# Patient Record
Sex: MALE | Race: BLACK OR AFRICAN AMERICAN | Employment: FULL TIME | URBAN - METROPOLITAN AREA
[De-identification: names, ages, dates, MRNs, and addresses within clinical notes are randomized per-mention and may not be internally consistent; named-entity substitution may affect disease eponyms.]

---

## 2017-01-04 ENCOUNTER — OFFICE VISIT (OUTPATIENT)
Dept: INTERNAL MEDICINE CLINIC | Age: 22
End: 2017-01-04

## 2017-01-04 VITALS
BODY MASS INDEX: 22.5 KG/M2 | WEIGHT: 180 LBS | HEART RATE: 67 BPM | OXYGEN SATURATION: 98 % | SYSTOLIC BLOOD PRESSURE: 112 MMHG | DIASTOLIC BLOOD PRESSURE: 72 MMHG

## 2017-01-04 DIAGNOSIS — R05.9 COUGH: Primary | ICD-10-CM

## 2017-01-04 DIAGNOSIS — R06.2 WHEEZING: ICD-10-CM

## 2017-01-04 PROCEDURE — 94640 AIRWAY INHALATION TREATMENT: CPT | Performed by: INTERNAL MEDICINE

## 2017-01-04 PROCEDURE — 99204 OFFICE O/P NEW MOD 45 MIN: CPT | Performed by: INTERNAL MEDICINE

## 2017-01-04 RX ORDER — IPRATROPIUM BROMIDE AND ALBUTEROL SULFATE 2.5; .5 MG/3ML; MG/3ML
1 SOLUTION RESPIRATORY (INHALATION) ONCE
Status: COMPLETED | OUTPATIENT
Start: 2017-01-04 | End: 2017-01-04

## 2017-01-04 RX ADMIN — IPRATROPIUM BROMIDE AND ALBUTEROL SULFATE 1 AMPULE: 2.5; .5 SOLUTION RESPIRATORY (INHALATION) at 16:33

## 2017-01-06 ASSESSMENT — ENCOUNTER SYMPTOMS
SPUTUM PRODUCTION: 0
HEMOPTYSIS: 0
COUGH: 1
RHINORRHEA: 0
WHEEZING: 1
EYE REDNESS: 0
SHORTNESS OF BREATH: 1
SINUS PRESSURE: 0
FACIAL SWELLING: 0
EYE PAIN: 0
SORE THROAT: 0

## 2018-09-18 ENCOUNTER — TELEPHONE (OUTPATIENT)
Dept: INTERNAL MEDICINE CLINIC | Age: 23
End: 2018-09-18

## 2018-09-18 NOTE — TELEPHONE ENCOUNTER
Bad congestion, chest is hurting from coughing, getting worse fir 3-4 days. requesting to only see dr. Loan Encinas. Any time today even last appointment is okay.  Needing a call back [phone]

## 2018-09-20 ENCOUNTER — OFFICE VISIT (OUTPATIENT)
Dept: INTERNAL MEDICINE CLINIC | Age: 23
End: 2018-09-20

## 2018-09-20 VITALS
BODY MASS INDEX: 20.62 KG/M2 | OXYGEN SATURATION: 94 % | SYSTOLIC BLOOD PRESSURE: 118 MMHG | HEART RATE: 69 BPM | TEMPERATURE: 97.9 F | WEIGHT: 165 LBS | DIASTOLIC BLOOD PRESSURE: 76 MMHG

## 2018-09-20 DIAGNOSIS — J45.40 MODERATE PERSISTENT ASTHMA WITHOUT COMPLICATION: ICD-10-CM

## 2018-09-20 PROCEDURE — 99214 OFFICE O/P EST MOD 30 MIN: CPT | Performed by: INTERNAL MEDICINE

## 2018-09-20 RX ORDER — FLUTICASONE FUROATE AND VILANTEROL 100; 25 UG/1; UG/1
1 POWDER RESPIRATORY (INHALATION) DAILY
Qty: 30 EACH | Refills: 5 | Status: SHIPPED | OUTPATIENT
Start: 2018-09-20 | End: 2020-02-19 | Stop reason: SDUPTHER

## 2018-09-20 RX ORDER — ALBUTEROL SULFATE 90 UG/1
2 AEROSOL, METERED RESPIRATORY (INHALATION) EVERY 6 HOURS PRN
Qty: 1 INHALER | Refills: 3 | Status: SHIPPED | OUTPATIENT
Start: 2018-09-20 | End: 2020-02-19 | Stop reason: SDUPTHER

## 2018-09-20 RX ORDER — PREDNISONE 50 MG/1
50 TABLET ORAL DAILY
Qty: 5 TABLET | Refills: 0 | Status: SHIPPED | OUTPATIENT
Start: 2018-09-20 | End: 2018-09-25

## 2018-09-20 RX ORDER — CETIRIZINE HYDROCHLORIDE 10 MG/1
10 TABLET ORAL DAILY
Qty: 30 TABLET | Refills: 5 | Status: SHIPPED | OUTPATIENT
Start: 2018-09-20 | End: 2020-02-13

## 2018-09-20 ASSESSMENT — ENCOUNTER SYMPTOMS
CHEST TIGHTNESS: 0
EYE PAIN: 0
EYE REDNESS: 0
COUGH: 1

## 2018-09-20 ASSESSMENT — PATIENT HEALTH QUESTIONNAIRE - PHQ9
SUM OF ALL RESPONSES TO PHQ QUESTIONS 1-9: 0
2. FEELING DOWN, DEPRESSED OR HOPELESS: 0
SUM OF ALL RESPONSES TO PHQ9 QUESTIONS 1 & 2: 0
SUM OF ALL RESPONSES TO PHQ QUESTIONS 1-9: 0
1. LITTLE INTEREST OR PLEASURE IN DOING THINGS: 0

## 2018-09-20 NOTE — PROGRESS NOTES
165 lb (74.8 kg)   PF: 350 L/min     Estimated body mass index is 20.62 kg/m² as calculated from the following:    Height as of 12/30/16: 6' 3\" (1.905 m). Weight as of this encounter: 165 lb (74.8 kg). Physical Exam   Constitutional: He appears well-nourished. No distress. HENT:   Right Ear: External ear normal.   Left Ear: External ear normal.   Mouth/Throat: No oropharyngeal exudate. Eyes: Pupils are equal, round, and reactive to light. EOM are normal. Right eye exhibits no discharge. Left eye exhibits no discharge. Neck: Normal range of motion. Cardiovascular: Normal rate, regular rhythm and normal heart sounds. No murmur heard. Pulmonary/Chest: Effort normal. He has no wheezes. He has no rales. Slightly prolonged expiration       ASSESSMENT/PLAN:  1. Moderate persistent asthma without complication  worsening  - Peak Flow Meter CARY; Please use as directed. Dispense: 1 Device; Refill: 0  - predniSONE (DELTASONE) 50 MG tablet; Take 1 tablet by mouth daily for 5 days  Dispense: 5 tablet; Refill: 0  - fluticasone-vilanterol (BREO ELLIPTA) 100-25 MCG/INH AEPB inhaler; Inhale 1 puff into the lungs daily  Dispense: 30 each; Refill: 5  - cetirizine (ZYRTEC) 10 MG tablet; Take 1 tablet by mouth daily  Dispense: 30 tablet; Refill: 5  - albuterol sulfate HFA (PROAIR HFA) 108 (90 Base) MCG/ACT inhaler; Inhale 2 puffs into the lungs every 6 hours as needed for Wheezing  Dispense: 1 Inhaler; Refill: 3      Return in about 7 days (around 9/27/2018) for asthma. An electronic signature was used to authenticate this note.     --Olive Beebe MD on 9/20/2018 at 11:52 AM

## 2018-10-04 ENCOUNTER — TELEPHONE (OUTPATIENT)
Dept: INTERNAL MEDICINE CLINIC | Age: 23
End: 2018-10-04

## 2019-02-22 DIAGNOSIS — J45.40 MODERATE PERSISTENT ASTHMA WITHOUT COMPLICATION: ICD-10-CM

## 2019-02-25 RX ORDER — PREDNISONE 50 MG/1
TABLET ORAL
Qty: 5 TABLET | Refills: 0 | OUTPATIENT
Start: 2019-02-25

## 2019-07-25 ENCOUNTER — TELEPHONE (OUTPATIENT)
Dept: INTERNAL MEDICINE CLINIC | Age: 24
End: 2019-07-25

## 2019-07-26 ENCOUNTER — OFFICE VISIT (OUTPATIENT)
Dept: INTERNAL MEDICINE CLINIC | Age: 24
End: 2019-07-26
Payer: COMMERCIAL

## 2019-07-26 VITALS
BODY MASS INDEX: 21.5 KG/M2 | WEIGHT: 172 LBS | DIASTOLIC BLOOD PRESSURE: 80 MMHG | HEART RATE: 60 BPM | SYSTOLIC BLOOD PRESSURE: 122 MMHG

## 2019-07-26 DIAGNOSIS — Z20.821 ZIKA VIRUS EXPOSURE: Primary | ICD-10-CM

## 2019-07-26 PROCEDURE — 99212 OFFICE O/P EST SF 10 MIN: CPT | Performed by: INTERNAL MEDICINE

## 2019-07-30 ENCOUNTER — TELEPHONE (OUTPATIENT)
Dept: INTERNAL MEDICINE CLINIC | Age: 24
End: 2019-07-30

## 2019-12-11 ENCOUNTER — TELEPHONE (OUTPATIENT)
Dept: INTERNAL MEDICINE CLINIC | Age: 24
End: 2019-12-11

## 2019-12-13 ENCOUNTER — OFFICE VISIT (OUTPATIENT)
Dept: INTERNAL MEDICINE CLINIC | Age: 24
End: 2019-12-13
Payer: COMMERCIAL

## 2019-12-13 VITALS
HEART RATE: 79 BPM | DIASTOLIC BLOOD PRESSURE: 76 MMHG | TEMPERATURE: 98 F | SYSTOLIC BLOOD PRESSURE: 118 MMHG | HEIGHT: 75 IN | RESPIRATION RATE: 16 BRPM | WEIGHT: 170 LBS | BODY MASS INDEX: 21.14 KG/M2 | OXYGEN SATURATION: 98 %

## 2019-12-13 DIAGNOSIS — R30.0 DYSURIA: Primary | ICD-10-CM

## 2019-12-13 DIAGNOSIS — Z20.2 EXPOSURE TO STD: ICD-10-CM

## 2019-12-13 LAB
BILIRUBIN, POC: NORMAL
BLOOD URINE, POC: NORMAL
CLARITY, POC: NORMAL
COLOR, POC: NORMAL
GLUCOSE URINE, POC: NORMAL
KETONES, POC: NORMAL
LEUKOCYTE EST, POC: NORMAL
NITRITE, POC: NORMAL
PH, POC: 6
PROTEIN, POC: NORMAL
SPECIFIC GRAVITY, POC: 1.03
UROBILINOGEN, POC: 0.2

## 2019-12-13 PROCEDURE — 81002 URINALYSIS NONAUTO W/O SCOPE: CPT | Performed by: INTERNAL MEDICINE

## 2019-12-13 PROCEDURE — 99213 OFFICE O/P EST LOW 20 MIN: CPT | Performed by: INTERNAL MEDICINE

## 2019-12-13 ASSESSMENT — ENCOUNTER SYMPTOMS
COLOR CHANGE: 0
GASTROINTESTINAL NEGATIVE: 1
DIARRHEA: 0
NAUSEA: 0
COUGH: 0
VOMITING: 0
RESPIRATORY NEGATIVE: 1
SHORTNESS OF BREATH: 0

## 2019-12-13 ASSESSMENT — PATIENT HEALTH QUESTIONNAIRE - PHQ9
1. LITTLE INTEREST OR PLEASURE IN DOING THINGS: 0
SUM OF ALL RESPONSES TO PHQ9 QUESTIONS 1 & 2: 0
SUM OF ALL RESPONSES TO PHQ QUESTIONS 1-9: 0
SUM OF ALL RESPONSES TO PHQ QUESTIONS 1-9: 0
2. FEELING DOWN, DEPRESSED OR HOPELESS: 0

## 2019-12-16 LAB
C. TRACHOMATIS DNA ,URINE: NEGATIVE
N. GONORRHOEAE DNA, URINE: NEGATIVE

## 2019-12-17 LAB
APTIMA MEDIA TYPE: NORMAL
SPECIMEN SOURCE: NORMAL
T. VAGINALIS AMPLIFIED: NEGATIVE

## 2020-02-13 RX ORDER — CETIRIZINE HYDROCHLORIDE 10 MG/1
TABLET ORAL
Qty: 30 TABLET | Refills: 5 | Status: SHIPPED | OUTPATIENT
Start: 2020-02-13 | End: 2020-12-18 | Stop reason: SDUPTHER

## 2020-02-14 RX ORDER — CETIRIZINE HYDROCHLORIDE 10 MG/1
10 TABLET ORAL DAILY
Qty: 30 TABLET | Refills: 5 | Status: SHIPPED | OUTPATIENT
Start: 2020-02-14

## 2020-02-19 ENCOUNTER — OFFICE VISIT (OUTPATIENT)
Dept: INTERNAL MEDICINE CLINIC | Age: 25
End: 2020-02-19
Payer: COMMERCIAL

## 2020-02-19 VITALS
DIASTOLIC BLOOD PRESSURE: 70 MMHG | TEMPERATURE: 98.5 F | BODY MASS INDEX: 20.62 KG/M2 | OXYGEN SATURATION: 98 % | SYSTOLIC BLOOD PRESSURE: 104 MMHG | WEIGHT: 165 LBS | HEART RATE: 62 BPM

## 2020-02-19 PROCEDURE — 94640 AIRWAY INHALATION TREATMENT: CPT | Performed by: INTERNAL MEDICINE

## 2020-02-19 PROCEDURE — 99214 OFFICE O/P EST MOD 30 MIN: CPT | Performed by: INTERNAL MEDICINE

## 2020-02-19 RX ORDER — FLUTICASONE FUROATE AND VILANTEROL 100; 25 UG/1; UG/1
1 POWDER RESPIRATORY (INHALATION) DAILY
Qty: 3 EACH | Refills: 0 | COMMUNITY
Start: 2020-02-19

## 2020-02-19 RX ORDER — IPRATROPIUM BROMIDE AND ALBUTEROL SULFATE 2.5; .5 MG/3ML; MG/3ML
1 SOLUTION RESPIRATORY (INHALATION) ONCE
Status: COMPLETED | OUTPATIENT
Start: 2020-02-19 | End: 2020-02-19

## 2020-02-19 RX ORDER — PREDNISONE 20 MG/1
40 TABLET ORAL DAILY
Qty: 10 TABLET | Refills: 0 | Status: SHIPPED | OUTPATIENT
Start: 2020-02-19 | End: 2020-02-24

## 2020-02-19 RX ORDER — FLUTICASONE FUROATE AND VILANTEROL 100; 25 UG/1; UG/1
1 POWDER RESPIRATORY (INHALATION) DAILY
Qty: 30 EACH | Refills: 11 | Status: SHIPPED | OUTPATIENT
Start: 2020-02-19

## 2020-02-19 RX ORDER — ALBUTEROL SULFATE 90 UG/1
2 AEROSOL, METERED RESPIRATORY (INHALATION) EVERY 6 HOURS PRN
Qty: 1 INHALER | Refills: 5 | Status: SHIPPED | OUTPATIENT
Start: 2020-02-19

## 2020-02-19 RX ADMIN — IPRATROPIUM BROMIDE AND ALBUTEROL SULFATE 1 AMPULE: 2.5; .5 SOLUTION RESPIRATORY (INHALATION) at 16:07

## 2020-02-19 NOTE — PROGRESS NOTES
acute distress. Appearance: Normal appearance. He is not ill-appearing. HENT:      Head: Normocephalic and atraumatic. Right Ear: Tympanic membrane, ear canal and external ear normal.      Left Ear: Tympanic membrane, ear canal and external ear normal.      Mouth/Throat:      Mouth: Mucous membranes are moist.      Pharynx: No oropharyngeal exudate or posterior oropharyngeal erythema. Eyes:      General:         Right eye: No discharge. Left eye: No discharge. Extraocular Movements: Extraocular movements intact. Pupils: Pupils are equal, round, and reactive to light. Neck:      Musculoskeletal: Normal range of motion. No muscular tenderness. Cardiovascular:      Rate and Rhythm: Normal rate and regular rhythm. Pulmonary:      Effort: Prolonged expiration present. No respiratory distress or retractions. Breath sounds: Examination of the right-upper field reveals wheezing. Examination of the left-upper field reveals wheezing. Examination of the right-middle field reveals decreased breath sounds. Examination of the left-middle field reveals decreased breath sounds. Examination of the right-lower field reveals decreased breath sounds. Examination of the left-lower field reveals decreased breath sounds. Decreased breath sounds and wheezing present. Neurological:      Mental Status: He is alert. ASSESSMENT/PLAN:  1. Moderate persistent asthma without complication  worsening  - CO PRESSURIZED/NONPRESSURIZED INHALATION TREATMENT  - ipratropium-albuterol (DUONEB) nebulizer solution 1 ampule  - predniSONE (DELTASONE) 20 MG tablet; Take 2 tablets by mouth daily for 5 days  Dispense: 10 tablet; Refill: 0 (new med)  - albuterol sulfate HFA (PROAIR HFA) 108 (90 Base) MCG/ACT inhaler; Inhale 2 puffs into the lungs every 6 hours as needed for Wheezing  Dispense: 1 Inhaler; Refill: 5  - fluticasone-vilanterol (BREO ELLIPTA) 100-25 MCG/INH AEPB inhaler;  Inhale 1 puff into the lungs

## 2020-02-24 ENCOUNTER — TELEPHONE (OUTPATIENT)
Dept: RHEUMATOLOGY | Age: 25
End: 2020-02-24

## 2020-02-24 NOTE — TELEPHONE ENCOUNTER
PA submitted for Breo Ellipta 100-25MCG/INH aerosol powder on CMM  Key: [id number] - PA Case ID: [id number] - Rx #: [id number]       Approved today   [id number]; Review Type:Prior Auth; Coverage Start Date:01/25/2020; Coverage End Date:02/23/2023;    Please advise patient. Thanks.

## 2020-08-02 ENCOUNTER — HOSPITAL ENCOUNTER (EMERGENCY)
Age: 25
Discharge: HOME | End: 2020-08-02
Payer: COMMERCIAL

## 2020-08-02 ENCOUNTER — APPOINTMENT (OUTPATIENT)
Dept: GENERAL RADIOLOGY | Age: 25
End: 2020-08-02
Payer: COMMERCIAL

## 2020-08-02 ENCOUNTER — APPOINTMENT (OUTPATIENT)
Dept: CT IMAGING | Age: 25
End: 2020-08-02
Payer: COMMERCIAL

## 2020-08-02 VITALS
TEMPERATURE: 98 F | HEIGHT: 75 IN | OXYGEN SATURATION: 99 % | SYSTOLIC BLOOD PRESSURE: 122 MMHG | HEART RATE: 52 BPM | RESPIRATION RATE: 14 BRPM | BODY MASS INDEX: 21.76 KG/M2 | WEIGHT: 175 LBS | DIASTOLIC BLOOD PRESSURE: 79 MMHG

## 2020-08-02 PROCEDURE — 72125 CT NECK SPINE W/O DYE: CPT

## 2020-08-02 PROCEDURE — 70450 CT HEAD/BRAIN W/O DYE: CPT

## 2020-08-02 PROCEDURE — 73030 X-RAY EXAM OF SHOULDER: CPT

## 2020-08-02 PROCEDURE — 99284 EMERGENCY DEPT VISIT MOD MDM: CPT

## 2020-08-02 RX ORDER — IBUPROFEN 800 MG/1
800 TABLET ORAL EVERY 8 HOURS PRN
Qty: 20 TABLET | Refills: 0 | Status: SHIPPED | OUTPATIENT
Start: 2020-08-02

## 2020-08-02 ASSESSMENT — PAIN DESCRIPTION - PAIN TYPE
TYPE: ACUTE PAIN
TYPE: ACUTE PAIN

## 2020-08-02 ASSESSMENT — PAIN DESCRIPTION - FREQUENCY
FREQUENCY: CONTINUOUS
FREQUENCY: CONTINUOUS

## 2020-08-02 ASSESSMENT — PAIN SCALES - GENERAL
PAINLEVEL_OUTOF10: 4
PAINLEVEL_OUTOF10: 5
PAINLEVEL_OUTOF10: 5

## 2020-08-02 ASSESSMENT — PAIN DESCRIPTION - PROGRESSION: CLINICAL_PROGRESSION: NOT CHANGED

## 2020-08-02 ASSESSMENT — PAIN DESCRIPTION - DESCRIPTORS
DESCRIPTORS: ACHING
DESCRIPTORS: ACHING

## 2020-08-02 ASSESSMENT — PAIN DESCRIPTION - LOCATION
LOCATION: SHOULDER
LOCATION: SHOULDER

## 2020-08-02 ASSESSMENT — PAIN DESCRIPTION - ORIENTATION
ORIENTATION: RIGHT
ORIENTATION: RIGHT

## 2020-08-02 ASSESSMENT — ENCOUNTER SYMPTOMS
ABDOMINAL PAIN: 0
VOMITING: 0
SHORTNESS OF BREATH: 0
NAUSEA: 0

## 2020-08-02 ASSESSMENT — PAIN - FUNCTIONAL ASSESSMENT
PAIN_FUNCTIONAL_ASSESSMENT: ACTIVITIES ARE NOT PREVENTED
PAIN_FUNCTIONAL_ASSESSMENT: 0-10

## 2020-08-02 ASSESSMENT — PAIN DESCRIPTION - ONSET: ONSET: SUDDEN
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
## 2020-08-02 NOTE — ED NOTES
Resp even and unlabored. A/ox4. No acute distress noted. Denies any need at this time. Call light within reach. Bed in lowest position. Will continue to monitor.       Ismael Israel RN  08/02/20 8639
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
## 2020-12-18 ENCOUNTER — PATIENT MESSAGE (OUTPATIENT)
Dept: INTERNAL MEDICINE CLINIC | Age: 25
End: 2020-12-18

## 2020-12-18 RX ORDER — CETIRIZINE HYDROCHLORIDE 10 MG/1
10 TABLET ORAL DAILY
Qty: 30 TABLET | Refills: 1 | Status: SHIPPED | OUTPATIENT
Start: 2020-12-18

## 2020-12-21 NOTE — TELEPHONE ENCOUNTER
From: Marybeth Bourgeois  To: Jyothi Canas MD  Sent: 12/18/2020 11:52 AM EST  Subject: Test Results Ashley Valdes. Marybeth Bourgeois. I was experiencing a discharge from my penis. Wanted to get tested and get this situation handled as soon as possible. Let me know when I could come in if possible. Thank you.

## 2021-01-07 ENCOUNTER — PATIENT MESSAGE (OUTPATIENT)
Dept: INTERNAL MEDICINE CLINIC | Age: 26
End: 2021-01-07

## 2021-01-07 ENCOUNTER — TELEPHONE (OUTPATIENT)
Dept: INTERNAL MEDICINE CLINIC | Age: 26
End: 2021-01-07

## 2021-01-07 NOTE — TELEPHONE ENCOUNTER
From: Angy Hale  To: Radha Osborn MD  Sent: 1/7/2021 11:30 AM EST  Subject: Test Results Question    Hello. I was unaware of the appt that was scheduled for me to get a std screening. Is there a number I could contact to see if dr Greg Hoffman can see me as soon as possible. I am having some symptoms. I want to have a screening as soon as possible. [phone].      ----- Message -----   From:RAYA Kennedy   Sent:12/21/2020 12:10 PM EST   To:Tra Gale   Subject:RE: Test Results Question    Barb He with Dr Greg Hoffman. He states that he can see you on Tuesday 12/22/20 @ 8:30am.    Thanks, Briana Arteaga      ----- Message -----   Stella Zarco   Sent:12/19/2020 11:03 AM EST   To:Mic Cole MD   Subject:RE: Test Results Question    Hey. I contacted that number. They didnt have any openings on Monday so they said scheduling would call me Monday and let me know. Does that sounds correct or does someone know what time I could be seen?      ----- Message -----   From:RAYA Morgan   Sent:12/18/2020 12:41 PM EST   To:Tra Gale   Subject:RE: Test Results Question      Dr Greg Hoffman can work you in this coming Monday morning. Please contact the office to schedule and confirm the time. [phone].      ----- Message -----   Stella Zarco   Sent:12/18/2020 11:52 AM EST   To:Mic Cole MD   Champagne Setting Dr. Greg Hoffman. Angy Hale. I was experiencing a discharge from my penis. Wanted to get tested and get this situation handled as soon as possible. Let me know when I could come in if possible. Thank you.

## 2021-01-07 NOTE — TELEPHONE ENCOUNTER
----- Message from Kennedy Vidal sent at 12/19/2020 11:00 AM EST -----  Subject: Appointment Request    Reason for Call: Routine Existing Condition Follow Up    QUESTIONS  Type of Appointment? Established Patient  Reason for appointment request? No appointments available during search  Additional Information for Provider? Per patient   dr Ko French said he could come in on monday. please advise and call  ---------------------------------------------------------------------------  --------------  CALL BACK INFO  What is the best way for the office to contact you? OK to leave message on   voicemail  Preferred Call Back Phone Number? [phone]  ---------------------------------------------------------------------------  --------------  SCRIPT ANSWERS  Relationship to Patient? Self  Appointment reason? Well Care/Follow Ups  Select a Well Care/Follow Ups appointment reason? Adult Existing Condition   Follow Up [Diabetes   CHF   COPD   Hypertension/Blood Pressure Check]  (Is the patient requesting to be seen urgently for their symptoms?)? No  Is this follow up request related to routine Diabetes Management? No  Are you having any new concerns about your existing condition? No  Have you been diagnosed with   tested for   or told that you are suspected of having COVID-19 (Coronavirus)? No  Have you had a fever or taken medication to treat a fever within the past   3 days? No  Have you had a cough   shortness of breath or flu-like symptoms within the past 3 days? No  Do you currently have flu-like symptoms including fever or chills   cough   shortness of breath   or difficulty breathing   or new loss of taste or smell? No  (Service Expert  click yes below to proceed with [employer] As Usual   Scheduling)?  Yes

## 2021-11-10 ENCOUNTER — APPOINTMENT (OUTPATIENT)
Dept: GENERAL RADIOLOGY | Age: 26
End: 2021-11-10

## 2021-11-10 ENCOUNTER — HOSPITAL ENCOUNTER (EMERGENCY)
Age: 26
Discharge: HOME | End: 2021-11-10
Attending: EMERGENCY MEDICINE

## 2021-11-10 VITALS
DIASTOLIC BLOOD PRESSURE: 80 MMHG | RESPIRATION RATE: 16 BRPM | HEIGHT: 75 IN | HEART RATE: 76 BPM | WEIGHT: 159.39 LBS | TEMPERATURE: 98 F | BODY MASS INDEX: 19.82 KG/M2 | SYSTOLIC BLOOD PRESSURE: 110 MMHG | OXYGEN SATURATION: 97 %

## 2021-11-10 DIAGNOSIS — J20.9 ACUTE BRONCHITIS WITH BRONCHOSPASM: ICD-10-CM

## 2021-11-10 DIAGNOSIS — Z20.822 SUSPECTED COVID-19 VIRUS INFECTION: Primary | ICD-10-CM

## 2021-11-10 LAB — SARS-COV-2: NOT DETECTED

## 2021-11-10 PROCEDURE — U0005 INFEC AGEN DETEC AMPLI PROBE: HCPCS

## 2021-11-10 PROCEDURE — 6370000000 HC RX 637 (ALT 250 FOR IP): Performed by: EMERGENCY MEDICINE

## 2021-11-10 PROCEDURE — 99284 EMERGENCY DEPT VISIT MOD MDM: CPT

## 2021-11-10 PROCEDURE — U0003 INFECTIOUS AGENT DETECTION BY NUCLEIC ACID (DNA OR RNA); SEVERE ACUTE RESPIRATORY SYNDROME CORONAVIRUS 2 (SARS-COV-2) (CORONAVIRUS DISEASE [COVID-19]), AMPLIFIED PROBE TECHNIQUE, MAKING USE OF HIGH THROUGHPUT TECHNOLOGIES AS DESCRIBED BY CMS-2020-01-R: HCPCS

## 2021-11-10 PROCEDURE — 71045 X-RAY EXAM CHEST 1 VIEW: CPT

## 2021-11-10 RX ORDER — DOXYCYCLINE HYCLATE 100 MG
100 TABLET ORAL 2 TIMES DAILY
Qty: 20 TABLET | Refills: 0 | Status: SHIPPED | OUTPATIENT
Start: 2021-11-10 | End: 2021-11-20

## 2021-11-10 RX ORDER — PREDNISONE 20 MG/1
40 TABLET ORAL DAILY
Qty: 8 TABLET | Refills: 0 | Status: SHIPPED | OUTPATIENT
Start: 2021-11-10 | End: 2021-11-14

## 2021-11-10 RX ORDER — IPRATROPIUM BROMIDE AND ALBUTEROL SULFATE 2.5; .5 MG/3ML; MG/3ML
1 SOLUTION RESPIRATORY (INHALATION) ONCE
Status: COMPLETED | OUTPATIENT
Start: 2021-11-10 | End: 2021-11-10

## 2021-11-10 RX ORDER — PREDNISONE 20 MG/1
60 TABLET ORAL ONCE
Status: DISCONTINUED | OUTPATIENT
Start: 2021-11-10 | End: 2021-11-10 | Stop reason: HOSPADM

## 2021-11-10 RX ADMIN — IPRATROPIUM BROMIDE AND ALBUTEROL SULFATE 1 AMPULE: .5; 3 SOLUTION RESPIRATORY (INHALATION) at 10:20

## 2021-11-10 NOTE — ED NOTES
Dc'd to home  Awake alert  Resp easy and unlabored  Aware how and why to take meds  To follow up with pmd  To increase fluids and aware how to check my chart for covid results  Walked out with ease  Still decreased breath sounds but increased air movement     Kerwin Swanson RN  11/10/21 4070

## 2021-11-10 NOTE — ED NOTES
Started breathing treatment  pauly well  Wheezing heard with rhonchi  Productive cough of thick mucus     Cristhian Tarango RN  11/10/21 7082

## 2021-11-10 NOTE — ED PROVIDER NOTES
[facility] ENCOUNTER      Pt Name: Jolena Cowden  MRN: [id number]  [place] 1995  Date of evaluation: 11/10/2021  Provider: Tigist Ellis, [address] [place] [address]       Chief Complaint   Patient presents with    Shortness of Breath    Cough    Nasal Congestion     thinks related to chest congestion         HISTORY OF PRESENT ILLNESS   (Location/Symptom, Timing/Onset, Context/Setting, Quality, Duration, Modifying Factors, Severity)  Note limiting factors. Jolena Cowden is a 32 y.o. male who presents to the emergency department with a complaint of cough, nasal congestion, yellow productive sputum. He states that 4 days ago he started to develop nasal congestion and clear rhinorrhea. He denies any body aches, headache, earache or sore throat. He does report some loss of taste and smell. He denies any neck pain or stiffness. He denies any chest pain heaviness pressure or tightness. He denies any shortness of breath but states that he has a little bit of wheezing and has had some chest congestion and difficulty coughing up the mucus. He denies any abdominal pain back pain or flank pain. He denies any nausea vomiting or diarrhea. He denies any exposure to Covid. He has never had Covid. He is not vaccinated. He does report that his symptoms are identical to what he has experienced in the past with environmental and seasonal allergies. He takes Zyrtec on a regular basis. He does have an inhaler at home in the past and has been told that he may have asthma but does not have symptoms on a daily basis. He has plenty of albuterol. He is convinced that his symptoms are secondary to environmental allergies which he states that he usually gets at this time of year. He states \"prednisone is usually what helps\". Nursing Notes were reviewed.     HPI        REVIEW OF SYSTEMS    (2-9 systems for level 4, 10 or more for level 5)       Constitutional: Negative for fever or chills. Respiratory: Negative for shortness of breath or dyspnea on exertion. Cardiovascular: Negative for chest pain. Gastrointestinal: Negative for abdominal pain. Negative for vomiting or diarrhea. Genitourinary: Negative for flank pain. Negative for dysuria. Negative for hematuria. Neurological: Negative for headache. Musculoskeletal:  Negative edema. Hematological: Negative for adenopathy. All systems are reviewed and are negative except for those listed above in the history of present illness and ROS. PAST MEDICAL HISTORY   History reviewed. No pertinent past medical history. SURGICAL HISTORY     History reviewed. No pertinent surgical history. CURRENT MEDICATIONS       Previous Medications    ALBUTEROL SULFATE HFA (PROAIR HFA) 108 (90 BASE) MCG/ACT INHALER    Inhale 2 puffs into the lungs every 6 hours as needed for Wheezing    CETIRIZINE (ZYRTEC) 10 MG TABLET    Take 1 tablet by mouth daily    CETIRIZINE (ZYRTEC) 10 MG TABLET    Take 1 tablet by mouth daily    FLUTICASONE-VILANTEROL (BREO ELLIPTA) 100-25 MCG/INH AEPB INHALER    Inhale 1 puff into the lungs daily    FLUTICASONE-VILANTEROL (BREO ELLIPTA) 100-25 MCG/INH AEPB INHALER    Inhale 1 puff into the lungs daily    IBUPROFEN (ADVIL;MOTRIN) 800 MG TABLET    Take 1 tablet by mouth every 8 hours as needed for Pain    PEAK FLOW METER CARY    Please use as directed. ALLERGIES     Seasonal    FAMILY HISTORY     History reviewed. No pertinent family history.        SOCIAL HISTORY       Social History     Socioeconomic History    Marital status: Single     Spouse name: None    Number of children: None    Years of education: None    Highest education level: None   Occupational History    None   Tobacco Use    Smoking status: Never Smoker    Smokeless tobacco: Never Used   Substance and Sexual Activity    Alcohol use: No    Drug use: No    Sexual activity: None   Other Topics Concern    None   Social History Narrative    None     Social Determinants of Health     Financial Resource Strain:     Difficulty of Paying Living Expenses: Not on file   Food Insecurity:     Worried About Running Out of Food in the Last Year: Not on file    William of Food in the Last Year: Not on file   Transportation Needs:     Lack of Transportation (Medical): Not on file    Lack of Transportation (Non-Medical): Not on file   Physical Activity:     Days of Exercise per Week: Not on file    Minutes of Exercise per Session: Not on file   Stress:     Feeling of Stress : Not on file   Social Connections:     Frequency of Communication with Friends and Family: Not on file    Frequency of Social Gatherings with Friends and Family: Not on file    Attends Alevism Services: Not on file    Active Member of [address] [place] [address] [employer] or Organizations: Not on file    Attends Club or Organization Meetings: Not on file    Marital Status: Not on file   Intimate Partner Violence:     Fear of Current or Ex-Partner: Not on file    Emotionally Abused: Not on file    Physically Abused: Not on file    Sexually Abused: Not on file   Housing Stability:     Unable to Pay for Housing in the Last Year: Not on file    Number of [place] in the Last Year: Not on file    Unstable Housing in the Last Year: Not on file       SCREENINGS             PHYSICAL EXAM    (up to 7 for level 4, 8 or more for level 5)     ED Triage Vitals [11/10/21 0959]   BP Temp Temp Source Pulse Resp SpO2 Height Weight   108/84 98 °F (36.7 °C) Oral 84 16 93 % 6' 3\" (1.905 m) 159 lb 6.3 oz (72.3 kg)         Physical Exam   Constitutional: Awake and alert. Very pleasant. Not ill-appearing. Feeling much improved after nebulizer treatment. Head: No visible evidence of trauma. Normocephalic. Eyes: Pupils equal and reactive. No photophobia. Conjunctiva normal.    HENT: Oral mucosa moist.  Airway patent. Pharynx without erythema. Nares were clear.   Neck:  Soft and supple. Nontender. Heart:  Regular rate and rhythm. No murmur. Lungs:  Clear to auscultation. Some scattered rhonchi but no wheezes. No conversational dyspnea or accessory muscle use. Chest: Chest wall non-tender. No evidence of trauma. Abdomen:  Soft, nondistended, bowel sounds present. Nontender. No guarding rigidity or rebound. No masses. Musculoskeletal: Extremities non-tender with full range of motion. Radial and dorsalis pedis pulses were intact. No calf tenderness erythema or edema. Neurological: Alert and oriented x 3. Speech clear. Cranial nerves II-XII intact. No facial droop. No acute focal motor or sensory deficits. Skin: Skin is warm and dry. No rash. Lymphatic:  No lympadenopathy. Psychiatric: Normal mood and affect. Behavior is normal.         DIAGNOSTIC RESULTS     EKG: All EKG's are interpreted by the Emergency Department Physician who either signs or Co-signs this chart in the absence of a cardiologist.        RADIOLOGY:   Non-plain film images such as CT, Ultrasound and MRI are read by the radiologist. Plain radiographic images are visualized and preliminarily interpreted by the emergency physician with the below findings:        Interpretation per the Radiologist below, if available at the time of this note:    XR CHEST PORTABLE   Final Result   1. No acute abnormality. ED BEDSIDE ULTRASOUND:   Performed by ED Physician - none    LABS:  Labs Reviewed   [id number]   COVID-19   COVID-19   COVID-19       All other labs were within normal range or not returned as of this dictation.     EMERGENCY DEPARTMENT COURSE and DIFFERENTIAL DIAGNOSIS/MDM:   Vitals:    Vitals:    11/10/21 0959   BP: 108/84   Pulse: 84   Resp: 16   Temp: 98 °F (36.7 °C)   TempSrc: Oral   SpO2: 93%   Weight: 159 lb 6.3 oz (72.3 kg)   Height: 6' 3\" (1.905 m)         MDM      The patient presents with cough, yellow productive sputum, rhinorrhea, nasal congestion, and chest congestion as noted above.  Initial oxygen saturation was 93% prior to my exam.  He was noted to be wheezing on arrival and received a DuoNeb treatment. He reports significant improvement. At the time of my examination his oxygen saturation is 98%. There are no expiratory wheezes, conversational dyspnea, or accessory muscle use. He does have some scattered rhonchi in the lungs but no crackles. He is well-appearing. He is hemodynamically stable. He is stable for discharge and outpatient management. I do have some concerns that this could be COVID-19. Covid PCR has been obtained and is pending. He was advised to quarantine until results are known to be negative or symptoms are resolved. He will be given a prescription for prednisone and doxycycline. He does have plenty of albuterol at home. REASSESSMENT          11:36 AM: Chest x-ray is negative. No infiltrate. The patient is stable for discharge. You are advised to self isolate for 10 days from onset of symptoms or until COVID-19 test is known to be negative. You are advised to stay home and do not leave the house unless absolutely necessary. If you do need to leave the house for an urgent reason, you must wear a mask at all times. Follow-up with a primary care physician by telephone within 1-2 business days to discuss whether or not you need a in person follow-up visit. Make sure that you wear a mask if a follow-up visit is required. Make sure that you review the COVID-19 isolation instructions and COVID-19 general instructions that are attached. Watch for chest pain, shortness of breath, or worsening symptoms. If condition worsens or new symptoms develop, return immediately to the emergency department. Drink plenty of fluids. Recommend Tylenol or ibuprofen as directed for pain or fever. CRITICAL CARE TIME   Total Critical Care time was 0 minutes, excluding separately reportable procedures.   There was a high probability of clinically significant/life threatening deterioration in the patient's condition which required my urgent intervention. CONSULTS:  None    PROCEDURES:  Unless otherwise noted below, none     Procedures        FINAL IMPRESSION      1. Suspected COVID-19 virus infection    2. Acute bronchitis with bronchospasm          DISPOSITION/PLAN   DISPOSITION        PATIENT REFERRED TO:  Lesli Ng, [address] [facility]  [phone]    Call today        DISCHARGE MEDICATIONS:  New Prescriptions    DOXYCYCLINE HYCLATE (VIBRA-TABS) 100 MG TABLET    Take 1 tablet by mouth 2 times daily for 10 days    PREDNISONE (DELTASONE) 20 MG TABLET    Take 2 tablets by mouth daily for 4 days     Controlled Substances Monitoring:     No flowsheet data found. (Please note that portions of this note were completed with a voice recognition program.  Efforts were made to edit the dictations but occasionally words are mis-transcribed. )    [address] Ne[address]ito Ellis DO (electronically signed)  Attending Emergency Physician          Yong Dhillon DO  11/10/21 9978

## 2021-11-10 NOTE — Clinical Note
Jone Carbajal was seen and treated in our emergency department on 11/10/2021. He may return to work on 11/20/2021. If you have any questions or concerns, please don't hesitate to call.       Cherise Francisco, DO

## 2022-03-24 DIAGNOSIS — J45.40 MODERATE PERSISTENT ASTHMA WITHOUT COMPLICATION: ICD-10-CM

## 2022-03-24 RX ORDER — CETIRIZINE HYDROCHLORIDE 10 MG/1
TABLET ORAL
Qty: 30 TABLET | Refills: 1 | OUTPATIENT
Start: 2022-03-24

## 2024-03-22 ENCOUNTER — TELEPHONE (OUTPATIENT)
Dept: PULMONOLOGY | Age: 29
End: 2024-03-22

## 2024-03-22 ENCOUNTER — OFFICE VISIT (OUTPATIENT)
Dept: PRIMARY CARE CLINIC | Age: 29
End: 2024-03-22

## 2024-03-22 VITALS
OXYGEN SATURATION: 98 % | DIASTOLIC BLOOD PRESSURE: 85 MMHG | TEMPERATURE: 97.3 F | SYSTOLIC BLOOD PRESSURE: 117 MMHG | HEIGHT: 75 IN | HEART RATE: 92 BPM | BODY MASS INDEX: 19.94 KG/M2 | WEIGHT: 160.4 LBS

## 2024-03-22 DIAGNOSIS — J45.21 MILD INTERMITTENT ASTHMA WITH ACUTE EXACERBATION: Primary | ICD-10-CM

## 2024-03-22 DIAGNOSIS — J30.2 SEASONAL ALLERGIES: ICD-10-CM

## 2024-03-22 DIAGNOSIS — J45.21 MILD INTERMITTENT ASTHMA WITH (ACUTE) EXACERBATION: Primary | ICD-10-CM

## 2024-03-22 DIAGNOSIS — Z00.00 ANNUAL PHYSICAL EXAM: ICD-10-CM

## 2024-03-22 RX ORDER — PREDNISONE 20 MG/1
40 TABLET ORAL DAILY
Qty: 10 TABLET | Refills: 0 | Status: SHIPPED | OUTPATIENT
Start: 2024-03-22 | End: 2024-03-27

## 2024-03-22 RX ORDER — CETIRIZINE HYDROCHLORIDE 10 MG/1
10 TABLET ORAL DAILY
Qty: 90 TABLET | Refills: 3 | Status: SHIPPED | OUTPATIENT
Start: 2024-03-22

## 2024-03-22 SDOH — ECONOMIC STABILITY: INCOME INSECURITY: HOW HARD IS IT FOR YOU TO PAY FOR THE VERY BASICS LIKE FOOD, HOUSING, MEDICAL CARE, AND HEATING?: NOT HARD AT ALL

## 2024-03-22 SDOH — ECONOMIC STABILITY: FOOD INSECURITY: WITHIN THE PAST 12 MONTHS, THE FOOD YOU BOUGHT JUST DIDN'T LAST AND YOU DIDN'T HAVE MONEY TO GET MORE.: NEVER TRUE

## 2024-03-22 SDOH — ECONOMIC STABILITY: FOOD INSECURITY: WITHIN THE PAST 12 MONTHS, YOU WORRIED THAT YOUR FOOD WOULD RUN OUT BEFORE YOU GOT MONEY TO BUY MORE.: NEVER TRUE

## 2024-03-22 SDOH — ECONOMIC STABILITY: HOUSING INSECURITY
IN THE LAST 12 MONTHS, WAS THERE A TIME WHEN YOU DID NOT HAVE A STEADY PLACE TO SLEEP OR SLEPT IN A SHELTER (INCLUDING NOW)?: NO

## 2024-03-22 ASSESSMENT — PATIENT HEALTH QUESTIONNAIRE - PHQ9
2. FEELING DOWN, DEPRESSED OR HOPELESS: NOT AT ALL
SUM OF ALL RESPONSES TO PHQ QUESTIONS 1-9: 0
SUM OF ALL RESPONSES TO PHQ9 QUESTIONS 1 & 2: 0
SUM OF ALL RESPONSES TO PHQ QUESTIONS 1-9: 0
SUM OF ALL RESPONSES TO PHQ QUESTIONS 1-9: 0
1. LITTLE INTEREST OR PLEASURE IN DOING THINGS: NOT AT ALL
SUM OF ALL RESPONSES TO PHQ QUESTIONS 1-9: 0

## 2024-03-22 ASSESSMENT — ENCOUNTER SYMPTOMS
COUGH: 1
SORE THROAT: 0
WHEEZING: 1
SHORTNESS OF BREATH: 0

## 2024-03-22 NOTE — TELEPHONE ENCOUNTER
Referred by:  Clarisa Viveros CNP    Reason:  Mild intermittent asthma with acute exacerbation     Previous Testing:  None    New Testing Needed:  CXR  PFT    Appt Date/Time: (made with pulmonologist)   5/6/2024 at [facility] with Dr Roman

## 2024-03-22 NOTE — PROGRESS NOTES
[facility]  [facility]  [address] [place] [id number]  Dept: [phone]  Dept Fax: [phone]     3/22/2024      Tra Viveros   1995     Chief Complaint   Patient presents with    New Patient     Establishing care allergies concerns flare ups medication refills       HPI     Patient presents to get established. He has history of asthma, seasonal allergies. Feels like when the weather changes it affects his breathing at times. He states for the past 2 weeks he's had congestion in his chest, intermittent wheezing, cough that is productive of phlegm. He has been using albuterol as needed. Previously prescribed Breo but has not been using this and stated for a couple of years he didn't have any symptoms but over the past few months has been sick multiple times. His grandmother had some leftover prednisone so he took a couple doses last week which helped symptoms. He has also been using Zyrtec though he admits he does not use as often as he should.     Will also do physical today. No major PMH other than asthma, allergies. Updated family history of in chart. 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         3/22/2024     2:10 PM 12/13/2019     1:28 PM 9/20/2018    11:18 AM   PHQ Scores   PHQ2 Score 0 0 0   PHQ9 Score 0 0 0     Interpretation of Total Score Depression Severity: 1-4 = Minimal depression, 5-9 = Mild depression, 10-14 = Moderate depression, 15-19 = Moderately severe depression, 20-27 = Severe depression     Prior to Visit Medications    Medication Sig Taking? Authorizing Provider   predniSONE (DELTASONE) 20 MG tablet Take 2 tablets by mouth daily for 5 days Yes Clarisa Viveros, APRN - CNP   albuterol sulfate HFA (PROAIR HFA) 108 (90 Base) MCG/ACT inhaler Inhale 2 puffs into the lungs every 6 hours as needed for Wheezing  Mic Jason MD   fluticasone-vilanterol (BREO ELLIPTA) 100-25 MCG/INH AEPB inhaler  no